# Patient Record
Sex: FEMALE | Race: WHITE | NOT HISPANIC OR LATINO | URBAN - METROPOLITAN AREA
[De-identification: names, ages, dates, MRNs, and addresses within clinical notes are randomized per-mention and may not be internally consistent; named-entity substitution may affect disease eponyms.]

---

## 2020-02-26 ENCOUNTER — OFFICE VISIT (OUTPATIENT)
Dept: ENDOCRINOLOGY | Facility: CLINIC | Age: 55
End: 2020-02-26
Payer: COMMERCIAL

## 2020-02-26 VITALS
HEART RATE: 69 BPM | WEIGHT: 147.8 LBS | DIASTOLIC BLOOD PRESSURE: 74 MMHG | SYSTOLIC BLOOD PRESSURE: 110 MMHG | BODY MASS INDEX: 25.23 KG/M2 | HEIGHT: 64 IN

## 2020-02-26 DIAGNOSIS — E89.0 HYPOTHYROIDISM FOLLOWING RADIOIODINE THERAPY: Primary | ICD-10-CM

## 2020-02-26 PROCEDURE — 99204 OFFICE O/P NEW MOD 45 MIN: CPT | Performed by: INTERNAL MEDICINE

## 2020-02-26 RX ORDER — KETOROLAC TROMETHAMINE 10 MG/1
10 TABLET, FILM COATED ORAL EVERY 8 HOURS PRN
COMMUNITY
Start: 2019-12-24

## 2020-02-26 RX ORDER — LEVOTHYROXINE SODIUM 112 UG/1
112 TABLET ORAL DAILY
COMMUNITY
Start: 2020-02-02 | End: 2020-02-27

## 2020-02-26 RX ORDER — BUSPIRONE HYDROCHLORIDE 5 MG/1
5 TABLET ORAL
COMMUNITY
Start: 2020-02-02

## 2020-02-26 RX ORDER — FLUOXETINE 20 MG/1
20 TABLET, FILM COATED ORAL DAILY
COMMUNITY
Start: 2020-01-29

## 2020-02-26 RX ORDER — ALPRAZOLAM 0.5 MG/1
TABLET ORAL
COMMUNITY

## 2020-02-26 NOTE — PROGRESS NOTES
Chief Complaint   Patient presents with    Hypothyroidism      Referring Provider  Anna Grier, Do  [address]  [address] [place] , [address]     History of Present Illness:   Etta Ulloa is a 49 y o  female with hypothyroidism seen ray [facility] care  Her history is significant for Post-ablation hypothyroidism, as she was treated with 7  9mCi of I-131 in 2006 for Graves' Disease  She intitally had Graves' Disease with symptoms of tremor, palpitations, increased warmth sensation, and weight loss  She had blurry vision, but this improved  She initially took Synthroid, but more recently has used generic levothyroxine  She states her doses have changes from 88-125mcg and have not been easy to control  She now takes generic Levothyroxine 112mcg daily, in the morning and does not miss doses  She waits to eat or take other medications  Today, her concerns include dry skin, fatigue, difficulties losing weight  Chronic constipation, does not seem to influenced byt any thyroid hormomes    She denies changes in neck, changes in voice, or dysphagia  No recent illness  Fhx:   thyroid disorder- father hypothyroidism and paternal GM with goiter  Hypertrophic CM in the family, but she is not effected      Patient Active Problem List   Diagnosis    Anxiety disorder    Hypothyroidism following radioiodine therapy      Past Medical History:   Diagnosis Date    Graves disease 2006      Past Surgical History:   Procedure Laterality Date    CHOLECYSTECTOMY  2006    approximate    HYSTERECTOMY W/ SALPINGO-OOPHERECTOMY Bilateral 2017      Family History   Problem Relation Age of Onset    Thyroid disease unspecified Father     Thyroid disease unspecified Paternal Grandmother      Social History     Tobacco Use    Smoking status: Never Smoker    Smokeless tobacco: Never Used   Substance Use Topics    Alcohol use: Yes     Frequency: Monthly or less     Allergies   Allergen Reactions    Codeine     Sulfa Antibiotics          Current Outpatient Medications:     ALPRAZolam (XANAX) 0 5 mg tablet, Take by mouth, Disp: , Rfl:     busPIRone (BUSPAR) 5 mg tablet, Take 5 mg by mouth daily at bedtime , Disp: , Rfl:     FLUoxetine (PROzac) 20 MG tablet, 20 mg daily , Disp: , Rfl:     ketorolac (TORADOL) 10 mg tablet, 10 mg every 8 (eight) hours as needed , Disp: , Rfl:     levothyroxine 112 mcg tablet, 112 mcg daily , Disp: , Rfl:     verapamil (CALAN-SR) 180 mg CR tablet, 180 mg daily , Disp: , Rfl:   Review of Systems   Constitutional: Positive for fatigue  HENT: Negative for hearing loss, trouble swallowing and voice change  Eyes: Negative for visual disturbance  Respiratory: Negative for cough and shortness of breath  Cardiovascular: Negative for palpitations  Gastrointestinal: Positive for constipation  Negative for nausea  Musculoskeletal: Positive for arthralgias  Skin:        Dry skin   Neurological: Positive for headaches  Psychiatric/Behavioral: Positive for sleep disturbance  All other systems reviewed and are negative  see also HPI    Physical Exam:  Body mass index is 25 37 kg/m²  /74   Pulse 69   Ht 5' 4" (1 626 m)   Wt 67 kg (147 lb 12 8 oz)   BMI 25 37 kg/m²    Wt Readings from Last 3 Encounters:   02/26/20 67 kg (147 lb 12 8 oz)   12/31/15 53 5 kg (118 lb)   04/23/15 49 4 kg (109 lb)       GEN: NAD  E/n/m nl facies, hearing intact bilat, tongue midline, lips nl  Eyes: no stare or proptosis, EOMI  Neck: trachea midline, thyroid NT to palpation, no nodules or neck masses noted, no cervical LAD  CV; heart reg rate s1s2 nl, no m/r/g appreciated, no KARLEE  Resp: CTAB, good effort  Ab+BS  Neuro: no tremor, 2+ DTRs in BUE  MS: no c/c in digits, moves all 4 ext, gait nl  Skin: warm and dry, no palmar erythema  Psych: nl mood and affect, no gross lapses in memory    DATA:  Labs:   TSH  6/2018 16 9  9/2018 2 31  3/2019  0  103  8/2019  0 421      Radiology  NM ablation 3/2006    Impression:  1  Hypothyroidism following radioiodine therapy           Plan:    Pal Gates was seen today for hypothyroidism  Diagnoses and all orders for this visit:    Hypothyroidism following radioiodine therapy  -     TSH, 3rd generation Lab Collect; Future        1  Hypothyroidism, likely due to iodine ablation: She is due for a TSH to assess the levothyroxine 112mcg dose, and this order is provided  She has been on the generic and may benefit from a branded preparation  Discussed with the patient and all questioned fully answered  She will call me if any problems arise          Guy Durbin MD

## 2020-02-27 DIAGNOSIS — E89.0 HYPOTHYROIDISM FOLLOWING RADIOIODINE THERAPY: Primary | ICD-10-CM

## 2020-02-27 RX ORDER — LEVOTHYROXINE SODIUM 100 MCG
100 TABLET ORAL DAILY
Qty: 90 TABLET | Refills: 3 | Status: SHIPPED | OUTPATIENT
Start: 2020-02-27 | End: 2020-02-28 | Stop reason: SDUPTHER

## 2020-02-28 DIAGNOSIS — E89.0 HYPOTHYROIDISM FOLLOWING RADIOIODINE THERAPY: ICD-10-CM

## 2020-02-28 RX ORDER — LEVOTHYROXINE SODIUM 100 MCG
100 TABLET ORAL DAILY
Qty: 90 TABLET | Refills: 3 | Status: SHIPPED | OUTPATIENT
Start: 2020-02-28 | End: 2020-10-28 | Stop reason: SDUPTHER

## 2020-02-28 NOTE — TELEPHONE ENCOUNTER
----- Message from Heather Lopez MD sent at 2/27/2020  2:36 PM EST -----  I have her lab results  Please tell her that her TSH is low and I will change her to brand Synthroid at 100mcg once daily  She will need her TSH done again in 6-8 weeks   Thank you

## 2020-03-04 ENCOUNTER — TELEPHONE (OUTPATIENT)
Dept: ENDOCRINOLOGY | Facility: CLINIC | Age: 55
End: 2020-03-04

## 2020-03-04 NOTE — TELEPHONE ENCOUNTER
Called [employer] at [phone] for prior auth on Synthroid  They gave me a case # of [id number]    The have a turn around of 72 hours

## 2020-10-28 ENCOUNTER — OFFICE VISIT (OUTPATIENT)
Dept: ENDOCRINOLOGY | Facility: CLINIC | Age: 55
End: 2020-10-28
Payer: COMMERCIAL

## 2020-10-28 VITALS
TEMPERATURE: 98.1 F | DIASTOLIC BLOOD PRESSURE: 78 MMHG | HEART RATE: 60 BPM | WEIGHT: 151.4 LBS | BODY MASS INDEX: 25.85 KG/M2 | HEIGHT: 64 IN | SYSTOLIC BLOOD PRESSURE: 110 MMHG

## 2020-10-28 DIAGNOSIS — E89.0 HYPOTHYROIDISM FOLLOWING RADIOIODINE THERAPY: ICD-10-CM

## 2020-10-28 PROCEDURE — 99213 OFFICE O/P EST LOW 20 MIN: CPT | Performed by: INTERNAL MEDICINE

## 2020-10-28 RX ORDER — CELECOXIB 200 MG/1
CAPSULE ORAL
COMMUNITY
Start: 2020-09-08

## 2020-10-28 RX ORDER — LEVOTHYROXINE SODIUM 100 MCG
100 TABLET ORAL DAILY
Qty: 30 TABLET | Refills: 11 | Status: SHIPPED | OUTPATIENT
Start: 2020-10-28 | End: 2021-04-08 | Stop reason: SDUPTHER

## 2020-10-28 RX ORDER — SUMATRIPTAN 100 MG/1
TABLET, FILM COATED ORAL
COMMUNITY

## 2021-04-08 DIAGNOSIS — E89.0 HYPOTHYROIDISM FOLLOWING RADIOIODINE THERAPY: ICD-10-CM

## 2021-04-09 NOTE — TELEPHONE ENCOUNTER
Outcome  Approvedtoday  Request Reference Number: [id number]  SYNTHROID TAB 100MCG is approved through 04/09/2022  Please refer to the fax or electronic case notice for further information  Pharmacy notified

## 2021-04-09 NOTE — TELEPHONE ENCOUNTER
Prior Authorization was done via Cover my meds, Awaiting for a respond  Ever Victor Whittaker: [facility] [place] Case ID: [id number] help?  Call us at [phone]  Status  Sent to [employer]  Drug  Synthroid 100MCG tablets  Form  [employer] Electronic Prior Authorization Form (2017 NCPDP)

## 2021-04-13 RX ORDER — LEVOTHYROXINE SODIUM 100 MCG
100 TABLET ORAL DAILY
Qty: 30 TABLET | Refills: 3 | Status: SHIPPED | OUTPATIENT
Start: 2021-04-13 | End: 2021-11-03 | Stop reason: SDUPTHER

## 2021-11-02 ENCOUNTER — TELEPHONE (OUTPATIENT)
Dept: ENDOCRINOLOGY | Facility: CLINIC | Age: 56
End: 2021-11-02

## 2021-11-02 DIAGNOSIS — E89.0 HYPOTHYROIDISM FOLLOWING RADIOIODINE THERAPY: Primary | ICD-10-CM

## 2021-11-03 ENCOUNTER — OFFICE VISIT (OUTPATIENT)
Dept: ENDOCRINOLOGY | Facility: CLINIC | Age: 56
End: 2021-11-03
Payer: COMMERCIAL

## 2021-11-03 VITALS
SYSTOLIC BLOOD PRESSURE: 110 MMHG | BODY MASS INDEX: 25.78 KG/M2 | HEART RATE: 68 BPM | DIASTOLIC BLOOD PRESSURE: 60 MMHG | WEIGHT: 151 LBS | HEIGHT: 64 IN

## 2021-11-03 DIAGNOSIS — E89.0 HYPOTHYROIDISM FOLLOWING RADIOIODINE THERAPY: Primary | ICD-10-CM

## 2021-11-03 PROCEDURE — 99214 OFFICE O/P EST MOD 30 MIN: CPT | Performed by: INTERNAL MEDICINE

## 2021-11-03 RX ORDER — CIPROFLOXACIN 500 MG/1
500 TABLET, FILM COATED ORAL DAILY
COMMUNITY
Start: 2021-10-28

## 2021-11-03 RX ORDER — METHOCARBAMOL 750 MG/1
750 TABLET, FILM COATED ORAL EVERY 8 HOURS PRN
COMMUNITY
Start: 2021-09-02

## 2021-11-03 RX ORDER — LEVOTHYROXINE SODIUM 100 MCG
100 TABLET ORAL DAILY
Qty: 30 TABLET | Refills: 9 | Status: SHIPPED | OUTPATIENT
Start: 2021-11-03 | End: 2022-10-29

## 2021-11-03 RX ORDER — METRONIDAZOLE 500 MG/1
500 TABLET ORAL DAILY
COMMUNITY
Start: 2021-10-28

## 2022-08-23 DIAGNOSIS — E89.0 HYPOTHYROIDISM FOLLOWING RADIOIODINE THERAPY: ICD-10-CM

## 2022-08-23 RX ORDER — LEVOTHYROXINE SODIUM 100 MCG
TABLET ORAL
Qty: 30 TABLET | Refills: 9 | Status: SHIPPED | OUTPATIENT
Start: 2022-08-23

## 2022-12-14 ENCOUNTER — OFFICE VISIT (OUTPATIENT)
Dept: ENDOCRINOLOGY | Facility: CLINIC | Age: 57
End: 2022-12-14

## 2022-12-14 VITALS
WEIGHT: 166.38 LBS | SYSTOLIC BLOOD PRESSURE: 100 MMHG | HEART RATE: 62 BPM | DIASTOLIC BLOOD PRESSURE: 60 MMHG | HEIGHT: 64 IN | BODY MASS INDEX: 28.41 KG/M2

## 2022-12-14 DIAGNOSIS — E89.0 HYPOTHYROIDISM FOLLOWING RADIOIODINE THERAPY: Primary | ICD-10-CM

## 2022-12-14 RX ORDER — LEVOTHYROXINE SODIUM 100 MCG
100 TABLET ORAL DAILY
Qty: 30 TABLET | Refills: 10 | Status: SHIPPED | OUTPATIENT
Start: 2022-12-14

## 2022-12-14 RX ORDER — ATORVASTATIN CALCIUM 40 MG/1
40 TABLET, FILM COATED ORAL DAILY
COMMUNITY
Start: 2022-11-18

## 2022-12-14 NOTE — PROGRESS NOTES
Chief Complaint   Patient presents with   • Hypothyroidism      Referring Provider  Michaela Trinh Do  [address] [place] [address],  [address] [facility]     History of Present Illness:   Hyun Maldonado is a 65 y o  female with hypothyroidism seen in follow-up  Last in the office in 11/2021  Since then, she had a "thunderclap headache" and did see Neurology (Dr Everette Fu)  There was a "flattened pituitary" but there was no concern at the part of Neurology in Nov 2022  She had a course of Medrol dose pack which did improve her symptoms and she is off steroids  She is feeing better      Her history is significant for Post-ablation hypothyroidism, as she was treated with 7  9mCi of I-131 in 2006 for Graves' Disease  She intitally had Graves' Disease with symptoms of tremor, palpitations, increased warmth sensation, and weight loss  She initially took Synthroid, but used generic levothyroxine  She states her doses have changes from 88-125mcg and have not been easy to control on generics  She now takes brand Synthroid 100mcg daily, in the morning and does not miss doses  She waits to eat or take other medications  She denies changes in neck, changes in voice, or dysphagia  Her only new complaint is left eyelid "twitching "    Fhx:   thyroid disorder- father hypothyroidism and paternal GM with goiter  Hypertrophic CM in the family, but she is not effected      Patient Active Problem List   Diagnosis   • Anxiety disorder   • Hypothyroidism following radioiodine therapy      Past Medical History:   Diagnosis Date   • Graves disease 2006      Past Surgical History:   Procedure Laterality Date   • CHOLECYSTECTOMY  2006    approximate   • HYSTERECTOMY W/ SALPINGO-OOPHERECTOMY Bilateral 2017      Family History   Problem Relation Age of Onset   • No Known Problems Mother    • Thyroid disease unspecified Father    • Lung cancer Sister    • Goiter Maternal Grandmother    • Thyroid disease unspecified Paternal Grandmother Social History     Tobacco Use   • Smoking status: Never   • Smokeless tobacco: Never   Substance Use Topics   • Alcohol use: Yes     Alcohol/week: 1 0 standard drink     Types: 1 Glasses of wine per week     Allergies   Allergen Reactions   • Codeine    • Hydrocodone Other (See Comments)     Felt sick      • Povidone Iodine Hives   • Sulfa Antibiotics    • Sulfamethoxazole-Trimethoprim Other (See Comments)     As a child      • Nickel Rash         Current Outpatient Medications:   •  ALPRAZolam (XANAX) 0 5 mg tablet, Take by mouth, Disp: , Rfl:   •  atorvastatin (LIPITOR) 40 mg tablet, Take 40 mg by mouth daily, Disp: , Rfl:   •  busPIRone (BUSPAR) 5 mg tablet, Take 5 mg by mouth daily at bedtime , Disp: , Rfl:   •  Calcium Carbonate-Vitamin D 500-50 MG-UNIT CAPS, Take by mouth, Disp: , Rfl:   •  celecoxib (CeleBREX) 200 mg capsule, TAKE 1 CAPSULE BY MOUTH ONCE DAILY AS NEEDED, STOP ALL OTHER NSAIDS, TAKE WITH FOOD, Disp: , Rfl:   •  FLUoxetine (PROzac) 20 MG tablet, 20 mg daily , Disp: , Rfl:   •  ketorolac (TORADOL) 10 mg tablet, 10 mg every 8 (eight) hours as needed , Disp: , Rfl:   •  Multiple Vitamins-Minerals (MULTIVITAMIN & MINERAL PO), Take 1 tablet by mouth daily, Disp: , Rfl:   •  Synthroid 100 MCG tablet, Take 1 tablet (100 mcg total) by mouth daily, Disp: 30 tablet, Rfl: 10  •  verapamil (CALAN-SR) 180 mg CR tablet, 180 mg daily , Disp: , Rfl:   •  VITAMIN D PO, Take 2,000 Units by mouth in the morning, Disp: , Rfl:   Review of Systems   Constitutional: Positive for unexpected weight change  Negative for fatigue  HENT: Negative for trouble swallowing and voice change  Eyes: Negative for visual disturbance  Respiratory: Negative for cough and shortness of breath  Cardiovascular: Positive for palpitations (occasional)  Gastrointestinal: Positive for constipation  Skin:        Dry skin   Neurological: Negative for dizziness, tremors and headaches     Psychiatric/Behavioral: Positive for sleep disturbance (maintaining sleep)  The patient is nervous/anxious  All other systems reviewed and are negative  see also HPI    Physical Exam:  Body mass index is 28 56 kg/m²  /60 (BP Location: Left arm, Patient Position: Sitting, Cuff Size: Large)   Pulse 62   Ht 5' 4" (1 626 m)   Wt 75 5 kg (166 lb 6 oz)   BMI 28 56 kg/m²    Wt Readings from Last 3 Encounters:   12/14/22 75 5 kg (166 lb 6 oz)   11/03/21 68 5 kg (151 lb)   10/28/20 68 7 kg (151 lb 6 4 oz)       GEN: NAD  E/n/m:  Mask in place, hearing intact bilat  Eyes: no stare or proptosis, EOMI  Neck: trachea midline, thyroid NT to palpation, no nodules or neck masses noted, no cervical LAD  Neuro: no tremor  MS: no c/c in digits, moves all 4 ext, gait nl  Skin: warm and dry, no palmar erythema  Psych: nl mood and affect, no gross lapses in memory    DATA:  Labs:   TSH  6/2018 16 9  9/2018 2 31  3/2019  0  103  8/2019  0 421  9/2020 TSH 0 9  11/2/2021 TSH 0 187  7/2022 0 72  11/2022 TSH 0 64      Radiology  NM ablation 3/2006    Impression:  1  Hypothyroidism following radioiodine therapy           Plan:    Tete Vaca was seen today for hypothyroidism  Diagnoses and all orders for this visit:    Hypothyroidism following radioiodine therapy  -     Synthroid 100 MCG tablet; Take 1 tablet (100 mcg total) by mouth daily        1  Hypothyroidism, likely due to iodine ablation: For now, she will continue brand Synthroid 100mcg once daily  RE: "flattened pituitary" she does not appear to have any complaints suggesting a pituitary process  Will not evaluate at this time  Discussed with the patient and all questioned fully answered  She will call me if any problems arise          Ayaka Elizabeth MD

## 2022-12-19 ENCOUNTER — TELEPHONE (OUTPATIENT)
Dept: ENDOCRINOLOGY | Facility: CLINIC | Age: 57
End: 2022-12-19

## 2022-12-20 NOTE — TELEPHONE ENCOUNTER
Approved, PA # [id number], Eff 12/19/2022 - 12/19/2023    Called and notified [facility] of approval

## 2023-11-28 ENCOUNTER — OFFICE VISIT (OUTPATIENT)
Dept: ENDOCRINOLOGY | Facility: CLINIC | Age: 58
End: 2023-11-28
Payer: COMMERCIAL

## 2023-11-28 VITALS
WEIGHT: 178 LBS | HEART RATE: 83 BPM | OXYGEN SATURATION: 99 % | HEIGHT: 64 IN | DIASTOLIC BLOOD PRESSURE: 70 MMHG | BODY MASS INDEX: 30.39 KG/M2 | SYSTOLIC BLOOD PRESSURE: 118 MMHG

## 2023-11-28 DIAGNOSIS — E89.0 HYPOTHYROIDISM FOLLOWING RADIOIODINE THERAPY: Primary | ICD-10-CM

## 2023-11-28 PROCEDURE — 99213 OFFICE O/P EST LOW 20 MIN: CPT | Performed by: INTERNAL MEDICINE

## 2023-11-28 RX ORDER — VERAPAMIL HYDROCHLORIDE 240 MG/1
240 TABLET, FILM COATED, EXTENDED RELEASE ORAL DAILY
COMMUNITY
Start: 2023-11-04

## 2023-11-28 RX ORDER — LEVOTHYROXINE SODIUM 100 MCG
100 TABLET ORAL DAILY
Qty: 30 TABLET | Refills: 10 | Status: SHIPPED | OUTPATIENT
Start: 2023-11-28

## 2023-11-28 RX ORDER — TRIAMCINOLONE ACETONIDE 1 MG/G
CREAM TOPICAL
COMMUNITY
Start: 2023-09-28 | End: 2023-11-28 | Stop reason: ALTCHOICE

## 2023-11-28 NOTE — PROGRESS NOTES
Chief Complaint   Patient presents with    Hypothyroidism      Referring Provider  Kat Morales, [address] [place] [facility],  [address]     History of Present Illness:   Mary Anne Steven is a 63 y.o. female with hypothyroidism seen in follow-up. Last in the office in 12/2022  She is in her usual state of health. Her history is significant for Post-ablation hypothyroidism, as she was treated with 7. 9mCi of I-131 in 2006 for Graves' Disease. She intitally had Graves' Disease with symptoms of tremor, palpitations, increased warmth sensation, and weight loss. She initially took Synthroid, but used generic levothyroxine. She states her doses have changes from 88-125mcg and have not been easy to control on generics. She now takes brand Synthroid 100mcg daily, in the morning and does not miss doses. She waits to eat or take other medications. She denies changes in neck, changes in voice, or dysphagia. Of note in 2022, she had a "thunderclap headache" and did see Neurology (Dr. Carlene Moreno), but no longer needs to see them  There was a "flattened pituitary" but there was no concern at the part of Neurology in Nov 2022. She had a course of Medrol dose pack which did improve her symptoms and she is off steroids. She is feeing better    Fhx:   thyroid disorder- father hypothyroidism and paternal GM with goiter. Hypertrophic CM in the family, but she is not effected.     Patient Active Problem List   Diagnosis    Anxiety disorder    Hypothyroidism following radioiodine therapy      Past Medical History:   Diagnosis Date    Graves disease 2006      Past Surgical History:   Procedure Laterality Date    CHOLECYSTECTOMY  2006    approximate    HYSTERECTOMY W/ SALPINGO-OOPHERECTOMY Bilateral 2017      Family History   Problem Relation Age of Onset    No Known Problems Mother     Thyroid disease unspecified Father     Lung cancer Sister     Goiter Maternal Grandmother     Thyroid disease unspecified Paternal Grandmother      Social History     Tobacco Use    Smoking status: Never    Smokeless tobacco: Never   Substance Use Topics    Alcohol use: Yes     Alcohol/week: 1.0 standard drink of alcohol     Types: 1 Glasses of wine per week     Allergies   Allergen Reactions    Codeine     Hydrocodone Other (See Comments)     Felt sick       Povidone Iodine Hives    Sulfa Antibiotics     Sulfamethoxazole-Trimethoprim Other (See Comments)     As a child       Nickel Rash         Current Outpatient Medications:     atorvastatin (LIPITOR) 40 mg tablet, Take 40 mg by mouth daily, Disp: , Rfl:     busPIRone (BUSPAR) 5 mg tablet, Take 5 mg by mouth daily at bedtime , Disp: , Rfl:     Calcium Carbonate-Vitamin D 500-50 MG-UNIT CAPS, Take by mouth, Disp: , Rfl:     celecoxib (CeleBREX) 200 mg capsule, TAKE 1 CAPSULE BY MOUTH ONCE DAILY AS NEEDED, STOP ALL OTHER NSAIDS, TAKE WITH FOOD, Disp: , Rfl:     FLUoxetine (PROzac) 20 MG tablet, 20 mg daily , Disp: , Rfl:     ketorolac (TORADOL) 10 mg tablet, 10 mg every 8 (eight) hours as needed , Disp: , Rfl:     Multiple Vitamins-Minerals (MULTIVITAMIN & MINERAL PO), Take 1 tablet by mouth daily, Disp: , Rfl:     Synthroid 100 MCG tablet, Take 1 tablet (100 mcg total) by mouth daily, Disp: 30 tablet, Rfl: 10    verapamil (CALAN-SR) 240 mg CR tablet, Take 240 mg by mouth daily, Disp: , Rfl:     VITAMIN D PO, Take 2,000 Units by mouth in the morning, Disp: , Rfl:   Review of Systems   Constitutional:  Positive for unexpected weight change. Negative for fatigue. HENT:  Negative for trouble swallowing and voice change. Eyes:  Negative for visual disturbance. Respiratory:  Negative for cough and shortness of breath. Gastrointestinal:  Positive for constipation. Skin:         Dry skin   Neurological:  Negative for tremors and headaches. Psychiatric/Behavioral:  Positive for sleep disturbance (maintaining sleep). The patient is not nervous/anxious.     see also HPI    Physical Exam:  Body mass index is 30.55 kg/m². /70   Pulse 83   Ht 5' 4" (1.626 m)   Wt 80.7 kg (178 lb)   SpO2 99%   BMI 30.55 kg/m²    Wt Readings from Last 3 Encounters:   11/28/23 80.7 kg (178 lb)   12/14/22 75.5 kg (166 lb 6 oz)   11/03/21 68.5 kg (151 lb)       GEN: NAD  E/n/m:  Mask in place, hearing intact bilat  Eyes: no stare or proptosis, EOMI  Neck: trachea midline, thyroid NT to palpation, no nodules or neck masses noted, no cervical LAD  Neuro: no tremor  MS: no c/c in digits, moves all 4 ext, gait nl  Skin: warm and dry, no palmar erythema  Psych: nl mood and affect, no gross lapses in memory    DATA:  Labs:   TSH  6/2018 16.9  9/2018 2.31  3/2019  0. 103  8/2019  0.421  9/2020 TSH 0.9  11/2/2021 TSH 0.187  7/2022 0.72  11/2022 TSH 0.64      Radiology  NM ablation 3/2006    Impression:  1. Hypothyroidism following radioiodine therapy             Plan:    Pancho Anderson was seen today for hypothyroidism. Diagnoses and all orders for this visit:    Hypothyroidism following radioiodine therapy  -     TSH, 3rd generation Lab Collect; Future  -     Synthroid 100 MCG tablet; Take 1 tablet (100 mcg total) by mouth daily          1. Hypothyroidism, likely due to iodine ablation: For now, she will continue brand Synthroid 100mcg once daily. Check TSH    RE: "flattened pituitary" she does not appear to have any complaints suggesting a pituitary process. Will not evaluate at this time. Discussed with the patient and all questioned fully answered. She will call me if any problems arise.         Ilene Sapp MD

## 2024-01-23 DIAGNOSIS — E89.0 HYPOTHYROIDISM FOLLOWING RADIOIODINE THERAPY: Primary | ICD-10-CM

## 2024-08-15 LAB — HBA1C MFR BLD HPLC: 5.7 %

## 2024-08-16 ENCOUNTER — TELEPHONE (OUTPATIENT)
Age: 59
End: 2024-08-16

## 2024-08-16 NOTE — TELEPHONE ENCOUNTER
Patient calling for lab results. All results scanned into media. Patient states her PCP suggested increasing her synthroid to 125mcg 7 days a week. She wants to hear from Dr. Laureano before making that change.  Please advise

## 2024-08-19 DIAGNOSIS — E89.0 HYPOTHYROIDISM FOLLOWING RADIOIODINE THERAPY: Primary | ICD-10-CM

## 2024-08-19 RX ORDER — LEVOTHYROXINE SODIUM 112 MCG
112 TABLET ORAL DAILY
Qty: 30 TABLET | Refills: 3 | Status: SHIPPED | OUTPATIENT
Start: 2024-08-19

## 2024-08-19 NOTE — TELEPHONE ENCOUNTER
Spoke to patient. She has been taking the brand Synthroid and has not started on any new medications/ supplements. She would like this sent to the [facility] in [place]

## 2024-10-21 ENCOUNTER — TELEPHONE (OUTPATIENT)
Age: 59
End: 2024-10-21

## 2024-10-21 NOTE — TELEPHONE ENCOUNTER
Patient called in wanting to have her lab order sent to [facility]. Pt does not have fax number currently so she will be calling back with the information.

## 2024-10-28 ENCOUNTER — OFFICE VISIT (OUTPATIENT)
Dept: ENDOCRINOLOGY | Facility: CLINIC | Age: 59
End: 2024-10-28
Payer: COMMERCIAL

## 2024-10-28 VITALS
BODY MASS INDEX: 31 KG/M2 | WEIGHT: 181.6 LBS | DIASTOLIC BLOOD PRESSURE: 66 MMHG | SYSTOLIC BLOOD PRESSURE: 104 MMHG | OXYGEN SATURATION: 96 % | HEART RATE: 68 BPM | HEIGHT: 64 IN

## 2024-10-28 DIAGNOSIS — E89.0 HYPOTHYROIDISM FOLLOWING RADIOIODINE THERAPY: Primary | ICD-10-CM

## 2024-10-28 PROCEDURE — 99214 OFFICE O/P EST MOD 30 MIN: CPT | Performed by: INTERNAL MEDICINE

## 2024-10-28 RX ORDER — CYCLOSPORINE 0.5 MG/ML
1 EMULSION OPHTHALMIC 2 TIMES DAILY
COMMUNITY

## 2024-10-28 RX ORDER — LEVOTHYROXINE SODIUM 112 MCG
112 TABLET ORAL DAILY
Qty: 30 TABLET | Refills: 5 | Status: SHIPPED | OUTPATIENT
Start: 2024-10-28 | End: 2024-11-07

## 2024-10-28 NOTE — PROGRESS NOTES
"Chief Complaint   Patient presents with    Hypothyroidism      Referring Provider  No referring provider defined for this encounter.     History of Present Illness:   Janina Kamara is a 59 y.o. female with hypothyroidism seen in follow-up. Last in the office in 11/2023.  Since the last visit, her TSH florentino to 17 in Aug 2024. She did not have a change in her meds or other illnesses.     Her history is significant for Post-ablation hypothyroidism, as she was treated with 7.9mCi of I-131 in 2006 for Graves' Disease.  She intitally had Graves' Disease with symptoms of tremor, palpitations, increased warmth sensation, and weight loss. She initially took Synthroid, but used generic levothyroxine. She states her doses have changes from 88-125mcg and have not been easy to control on generics. Levels are better with brand.    She now takes brand Synthroid 112mcg daily, in the morning and does not miss doses. She is taking calcium citrate with this in the morning. She still reports low energy, constipation, dry skin, and difficulty losing weight. Hre PCP ordered Wegovy to try but this was denied by her insurance.  Has chronic dry eyes and uses Restasis.     Of note in 2022, she had a \"thunderclap headache\" and did see Neurology (Dr. Ade Domínguez), but no longer needs to see them  There was a \"flattened pituitary\" but there was no concern at the part of Neurology in Nov 2022. She had a course of Medrol dose pack which did improve her symptoms and she is off steroids. She is feeing better    Fhx:   thyroid disorder- father hypothyroidism and paternal GM with goiter. Hypertrophic CM in the family, but she is not effected.    Patient Active Problem List   Diagnosis    Anxiety disorder    Hypothyroidism following radioiodine therapy      Past Medical History:   Diagnosis Date    Graves disease 2006      Past Surgical History:   Procedure Laterality Date    CHOLECYSTECTOMY  2006    approximate    HYSTERECTOMY W/ " SALPINGO-OOPHERECTOMY Bilateral 2017      Family History   Problem Relation Age of Onset    No Known Problems Mother     Thyroid disease unspecified Father     Lung cancer Sister     Goiter Maternal Grandmother     Thyroid disease unspecified Paternal Grandmother      Social History     Tobacco Use    Smoking status: Never    Smokeless tobacco: Never   Substance Use Topics    Alcohol use: Yes     Alcohol/week: 1.0 standard drink of alcohol     Types: 1 Glasses of wine per week     Allergies   Allergen Reactions    Codeine     Hydrocodone Other (See Comments)     Felt sick       Povidone Iodine Hives    Sulfa Antibiotics     Sulfamethoxazole-Trimethoprim Other (See Comments)     As a child       Nickel Rash         Current Outpatient Medications:     atorvastatin (LIPITOR) 40 mg tablet, Take 40 mg by mouth daily, Disp: , Rfl:     busPIRone (BUSPAR) 5 mg tablet, Take 5 mg by mouth daily at bedtime , Disp: , Rfl:     celecoxib (CeleBREX) 200 mg capsule, TAKE 1 CAPSULE BY MOUTH ONCE DAILY AS NEEDED, STOP ALL OTHER NSAIDS, TAKE WITH FOOD, Disp: , Rfl:     cycloSPORINE (RESTASIS) 0.05 % ophthalmic emulsion, Administer 1 drop to both eyes 2 (two) times a day, Disp: , Rfl:     FLUoxetine (PROzac) 20 MG tablet, 20 mg daily , Disp: , Rfl:     ketorolac (TORADOL) 10 mg tablet, 10 mg every 8 (eight) hours as needed , Disp: , Rfl:     Multiple Vitamins-Minerals (MULTIVITAMIN & MINERAL PO), Take 1 tablet by mouth daily, Disp: , Rfl:     Synthroid 112 MCG tablet, Take 1 tablet (112 mcg total) by mouth daily, Disp: 30 tablet, Rfl: 5    verapamil (CALAN-SR) 240 mg CR tablet, Take 240 mg by mouth daily, Disp: , Rfl:     VITAMIN D PO, Take 2,000 Units by mouth in the morning, Disp: , Rfl:     Calcium Carbonate-Vitamin D 500-50 MG-UNIT CAPS, Take by mouth (Patient not taking: Reported on 10/28/2024), Disp: , Rfl:   Review of Systems   Constitutional:  Positive for fatigue and unexpected weight change.   HENT:  Negative for trouble  "swallowing and voice change.    Eyes:  Negative for visual disturbance.   Respiratory:  Negative for cough and shortness of breath.    Gastrointestinal:  Positive for constipation.   Skin:         Dry skin   Neurological:  Negative for tremors and headaches.   Psychiatric/Behavioral:  The patient is not nervous/anxious.    see also HPI    Physical Exam:  Body mass index is 31.17 kg/m².  /66   Pulse 68   Ht 5' 4\" (1.626 m)   Wt 82.4 kg (181 lb 9.6 oz)   SpO2 96%   BMI 31.17 kg/m²    Wt Readings from Last 3 Encounters:   10/28/24 82.4 kg (181 lb 9.6 oz)   11/28/23 80.7 kg (178 lb)   12/14/22 75.5 kg (166 lb 6 oz)       GEN: NAD  E/n/m:  Mask in place, hearing intact bilat  Eyes: no stare or proptosis, EOMI  Neck: trachea midline, thyroid NT to palpation, no nodules or neck masses noted, no cervical LAD  Neuro: no tremor  MS: no c/c in digits, moves all 4 ext, gait nl  Skin: warm and dry, no palmar erythema  Psych: nl mood and affect, no gross lapses in memory    DATA:  Labs:   10/2024 TSH 2.000      Radiology  NM ablation 3/2006    Impression/Plan:  Problem List Items Addressed This Visit       Hypothyroidism following radioiodine therapy - Primary      For now, she will continue brand Synthroid 112mcg once daily. Check TSH in 5-6 mos         Relevant Medications    Synthroid 112 MCG tablet    Other Relevant Orders    TSH, 3rd generation     RTC in1y    Discussed with the patient and all questioned fully answered. She will call me if any problems arise.        Mily Laureano MD  "

## 2024-11-04 ENCOUNTER — TELEPHONE (OUTPATIENT)
Age: 59
End: 2024-11-04

## 2024-11-04 NOTE — TELEPHONE ENCOUNTER
PA for Synthroid 112 MCG tablet SUBMITTED     via      [x][employer]Case ID # [id number]      Office notes sent, clinical questions answered. Awaiting determination    Turnaround time for your insurance to make a decision on your Prior Authorization can take 7-21 business days.

## 2024-11-04 NOTE — TELEPHONE ENCOUNTER
[facility] prior auth department was calling to see if patient was ever on levothyroxine. I see an order placed back in 2020, but they want to see office notes stating she was on it. This can be faxed to [phone] reference number is [id number]

## 2024-11-06 NOTE — TELEPHONE ENCOUNTER
PA for Synthroid 112 MCG tablet  DENIED    Reason:        Message sent to office clinical pool Yes    Denial letter scanned into Media Yes    Appeal started No (Provider will need to decide if appeal is warranted and send clinical documentation to Prior Authorization Team for initiation.)    **Please follow up with your patient regarding denial and next steps**

## 2024-11-07 ENCOUNTER — TELEPHONE (OUTPATIENT)
Dept: ENDOCRINOLOGY | Facility: CLINIC | Age: 59
End: 2024-11-07

## 2024-11-07 DIAGNOSIS — E89.0 HYPOTHYROIDISM FOLLOWING RADIOIODINE THERAPY: Primary | ICD-10-CM

## 2024-11-07 RX ORDER — LEVOTHYROXINE SODIUM 112 UG/1
112 TABLET ORAL DAILY
Qty: 90 TABLET | Refills: 2 | Status: SHIPPED | OUTPATIENT
Start: 2024-11-07

## 2024-11-07 NOTE — TELEPHONE ENCOUNTER
Patient called stating she received the message and she is ok with trying Levoxyl.  Please send to the [facility] on file.

## 2024-11-07 NOTE — TELEPHONE ENCOUNTER
Left detailed message for patient. Received forms from [email or web address] requiring more information for synthroid PA. Spoke with Dr Laureano, she said patient can try Levoxyl. Patient has tried levothyroxine in the past as well. If patient does not do well on the Levoxyl then we would be able to move along with the Synthroid. As they require to fail 2 additional formulary medications.     Form scanned into chart

## 2025-07-29 DIAGNOSIS — E89.0 HYPOTHYROIDISM FOLLOWING RADIOIODINE THERAPY: ICD-10-CM

## 2025-07-29 RX ORDER — LEVOTHYROXINE SODIUM 112 UG/1
112 TABLET ORAL DAILY
Qty: 90 TABLET | Refills: 1 | Status: SHIPPED | OUTPATIENT
Start: 2025-07-29